# Patient Record
Sex: FEMALE | Race: WHITE
[De-identification: names, ages, dates, MRNs, and addresses within clinical notes are randomized per-mention and may not be internally consistent; named-entity substitution may affect disease eponyms.]

---

## 2019-03-27 ENCOUNTER — HOSPITAL ENCOUNTER (EMERGENCY)
Dept: HOSPITAL 10 - FTE | Age: 54
Discharge: HOME | End: 2019-03-27
Payer: COMMERCIAL

## 2019-03-27 ENCOUNTER — HOSPITAL ENCOUNTER (EMERGENCY)
Dept: HOSPITAL 91 - FTE | Age: 54
Discharge: HOME | End: 2019-03-27
Payer: MEDICAID

## 2019-03-27 VITALS — RESPIRATION RATE: 20 BRPM | HEART RATE: 78 BPM | SYSTOLIC BLOOD PRESSURE: 138 MMHG | DIASTOLIC BLOOD PRESSURE: 70 MMHG

## 2019-03-27 VITALS
BODY MASS INDEX: 43.2 KG/M2 | HEIGHT: 61 IN | HEIGHT: 61 IN | WEIGHT: 228.84 LBS | WEIGHT: 228.84 LBS | BODY MASS INDEX: 43.2 KG/M2

## 2019-03-27 DIAGNOSIS — E11.9: ICD-10-CM

## 2019-03-27 DIAGNOSIS — N12: Primary | ICD-10-CM

## 2019-03-27 DIAGNOSIS — I10: ICD-10-CM

## 2019-03-27 DIAGNOSIS — Z79.84: ICD-10-CM

## 2019-03-27 LAB
ADD MAN DIFF?: NO
ADD UMIC: YES
ALANINE AMINOTRANSFERASE: 20 IU/L (ref 13–69)
ALBUMIN/GLOBULIN RATIO: 1.1
ALBUMIN: 4.1 G/DL (ref 3.3–4.9)
ALKALINE PHOSPHATASE: 108 IU/L (ref 42–121)
ANION GAP: 10 (ref 5–13)
ASPARTATE AMINO TRANSFERASE: 20 IU/L (ref 15–46)
BASOPHIL #: 0.1 10^3/UL (ref 0–0.1)
BASOPHILS %: 0.5 % (ref 0–2)
BILIRUBIN,DIRECT: 0 MG/DL (ref 0–0.2)
BILIRUBIN,TOTAL: 0.1 MG/DL (ref 0.2–1.3)
BLOOD UREA NITROGEN: 17 MG/DL (ref 7–20)
CALCIUM: 9.4 MG/DL (ref 8.4–10.2)
CARBON DIOXIDE: 27 MMOL/L (ref 21–31)
CHLORIDE: 103 MMOL/L (ref 97–110)
CREATININE: 0.61 MG/DL (ref 0.44–1)
EOSINOPHILS #: 0.2 10^3/UL (ref 0–0.5)
EOSINOPHILS %: 2.2 % (ref 0–7)
GLOBULIN: 3.7 G/DL (ref 1.3–3.2)
GLUCOSE: 67 MG/DL (ref 70–220)
HEMATOCRIT: 36.6 % (ref 37–47)
HEMOGLOBIN: 11.4 G/DL (ref 12–16)
IMMATURE GRANS #M: 0.06 10^3/UL (ref 0–0.03)
IMMATURE GRANS % (M): 0.6 % (ref 0–0.43)
LYMPHOCYTES #: 2.2 10^3/UL (ref 0.8–2.9)
LYMPHOCYTES %: 21.5 % (ref 15–51)
MEAN CORPUSCULAR HEMOGLOBIN: 25.2 PG (ref 29–33)
MEAN CORPUSCULAR HGB CONC: 31.1 G/DL (ref 32–37)
MEAN CORPUSCULAR VOLUME: 80.8 FL (ref 82–101)
MEAN PLATELET VOLUME: 9.3 FL (ref 7.4–10.4)
MONOCYTE #: 0.7 10^3/UL (ref 0.3–0.9)
MONOCYTES %: 6.9 % (ref 0–11)
NEUTROPHIL #: 7.1 10^3/UL (ref 1.6–7.5)
NEUTROPHILS %: 68.3 % (ref 39–77)
NUCLEATED RED BLOOD CELLS #: 0 10^3/UL (ref 0–0)
NUCLEATED RED BLOOD CELLS%: 0 /100WBC (ref 0–0)
PLATELET COUNT: 367 10^3/UL (ref 140–415)
POTASSIUM: 4 MMOL/L (ref 3.5–5.1)
RED BLOOD COUNT: 4.53 10^6/UL (ref 4.2–5.4)
RED CELL DISTRIBUTION WIDTH: 15.2 % (ref 11.5–14.5)
SODIUM: 140 MMOL/L (ref 135–144)
TOTAL PROTEIN: 7.8 G/DL (ref 6.1–8.1)
UR ASCORBIC ACID: NEGATIVE MG/DL
UR BACTERIA: (no result) /HPF
UR BILIRUBIN (DIP): NEGATIVE MG/DL
UR BLOOD (DIP): NEGATIVE MG/DL
UR CLARITY: CLEAR
UR COLOR: (no result)
UR GLUCOSE (DIP): NEGATIVE MG/DL
UR KETONES (DIP): NEGATIVE MG/DL
UR LEUKOCYTE ESTERASE (DIP): (no result) LEU/UL
UR NITRITE (DIP): NEGATIVE MG/DL
UR PH (DIP): 5 (ref 5–9)
UR RBC: 1 /HPF (ref 0–5)
UR SPECIFIC GRAVITY (DIP): 1.01 (ref 1–1.03)
UR SQUAMOUS EPITHELIAL CELL: (no result) /HPF
UR TOTAL PROTEIN (DIP): NEGATIVE MG/DL
UR UROBILINOGEN (DIP): NEGATIVE MG/DL
UR WBC: 1 /HPF (ref 0–5)
WHITE BLOOD COUNT: 10.4 10^3/UL (ref 4.8–10.8)

## 2019-03-27 PROCEDURE — 96372 THER/PROPH/DIAG INJ SC/IM: CPT

## 2019-03-27 PROCEDURE — 81001 URINALYSIS AUTO W/SCOPE: CPT

## 2019-03-27 PROCEDURE — 87086 URINE CULTURE/COLONY COUNT: CPT

## 2019-03-27 PROCEDURE — 85025 COMPLETE CBC W/AUTO DIFF WBC: CPT

## 2019-03-27 PROCEDURE — 99284 EMERGENCY DEPT VISIT MOD MDM: CPT

## 2019-03-27 PROCEDURE — 80053 COMPREHEN METABOLIC PANEL: CPT

## 2019-03-27 RX ADMIN — HYDROCODONE BITARTRATE AND ACETAMINOPHEN 1 TAB: 5; 325 TABLET ORAL at 16:50

## 2019-03-27 RX ADMIN — KETOROLAC TROMETHAMINE 1 MG: 30 INJECTION, SOLUTION INTRAMUSCULAR at 16:48

## 2019-03-27 RX ADMIN — CEFTRIAXONE SODIUM 1 GM: 1 INJECTION, POWDER, FOR SOLUTION INTRAMUSCULAR; INTRAVENOUS at 17:48

## 2019-03-27 NOTE — ERD
ER Documentation


Chief Complaint


Chief Complaint





low back pain d/t fall yesterday





HPI


53-year-old female presents with complaint of dysuria, low back pain for the 


past 3 days.  Has not been taking any treatments.  Denies hematuria, vomiting, 


fevers.  History of diabetes and hypertension.  Takes metformin.  Denies 


allergies.





ROS


All systems reviewed and are negative except as per history of present illness.





Medications


Home Meds


Active Scripts


Hydrocodone/Acetaminophen (Norco 5-325 Tablet) 1 Each Tablet, 1 TAB PO Q6H PRN 


for PAIN, #10 TAB


   Prov:RUBY PORTER         3/27/19


Ibuprofen* (Motrin*) 600 Mg Tab, 600 MG PO Q6H PRN for PAIN AND OR ELEVATED TEMP


, #30 TAB


   Prov:RUBY PORTER         3/27/19


Cephalexin* (Keflex*) 500 Mg Capsule, 500 MG PO BID for 14 Days, CAP


   Prov:RUBY PORTER         3/27/19


Nystatin (Nystatin Powder) 1 Each Powder.ea., 1 APPLIC TOPICAL BID for 7 Days, 


#1 BOTTLE


   Prov:PEPE CENTENO MD         1/12/17


Fluconazole* (Diflucan*) 150 Mg Tablet, 150 MG PO ONCE, #2 TAB


   Repeat after 1 week for persistent symptoms.


   Prov:PEPE CENTENO MD         1/12/17





Allergies


Allergies:  


Coded Allergies:  


     No Known Allergy (Unverified , 3/27/19)





PMhx/Soc


Medical and Surgical Hx:  pt denies Surgical Hx


Hx Cardiac Disorders:  Yes (HTN)


Hx Miscellaneous Medical Probl:  Yes (dm)


Hx Alcohol Use:  No


Hx Substance Use:  No


Hx Tobacco Use:  No


Smoking Status:  Never smoker





FmHx


Family History:  No diabetes, No coronary disease, No other





Physical Exam


Vitals





Vital Signs


  Date      Temp  Pulse  Resp  B/P (MAP)   Pulse Ox  O2          O2 Flow    FiO2


Time                                                 Delivery    Rate


   3/27/19  97.1     78    20      138/70        99


     13:54                           (92)





Physical Exam


Const:   No acute distress


Head:   Atraumatic 


Eyes:    Normal Conjunctiva


ENT:    Normal External Ears, Nose and Mouth.


Neck:               Full range of motion. No meningismus.


Resp:   Clear to auscultation bilaterally


Cardio:   Regular rate and rhythm, no murmurs


Abd:    Soft, non tender, non distended. Normal bowel sounds


Skin:   No petechiae or rashes


Back:   No midline tenderness.  Positive CVA tenderness bilaterally.


Ext:    No cyanosis, or edema.  No saddle numbness


Neur:   Awake and alert


Psych:    Normal Mood and Affect


Result Diagram:  


3/27/19 1639                                                                    


           3/27/19 1639





Results 24 hrs





Laboratory Tests


              Test
                                  3/27/19
16:39


              White Blood Count                      10.4 10^3/ul


              Red Blood Count                        4.53 10^6/ul


              Hemoglobin                                11.4 g/dl


              Hematocrit                                   36.6 %


              Mean Corpuscular Volume                     80.8 fl


              Mean Corpuscular Hemoglobin                 25.2 pg


              Mean Corpuscular Hemoglobin
Concent      31.1 g/dl 



              Red Cell Distribution Width                  15.2 %


              Platelet Count                          367 10^3/UL


              Mean Platelet Volume                         9.3 fl


              Immature Granulocytes %                     0.600 %


              Neutrophils %                                68.3 %


              Lymphocytes %                                21.5 %


              Monocytes %                                   6.9 %


              Eosinophils %                                 2.2 %


              Basophils %                                   0.5 %


              Nucleated Red Blood Cells %             0.0 /100WBC


              Immature Granulocytes #               0.060 10^3/ul


              Neutrophils #                           7.1 10^3/ul


              Lymphocytes #                           2.2 10^3/ul


              Monocytes #                             0.7 10^3/ul


              Eosinophils #                           0.2 10^3/ul


              Basophils #                             0.1 10^3/ul


              Nucleated Red Blood Cells #             0.0 10^3/ul


              Urine Color                          STRAW


              Urine Clarity                        CLEAR


              Urine pH                                        5.0


              Urine Specific Gravity                        1.012


              Urine Ketones                        NEGATIVE mg/dL


              Urine Nitrite                        NEGATIVE mg/dL


              Urine Bilirubin                      NEGATIVE mg/dL


              Urine Urobilinogen                   NEGATIVE mg/dL


              Urine Leukocyte Esterase             TRACE Tex/ul


              Urine Microscopic RBC                        1 /HPF


              Urine Microscopic WBC                        1 /HPF


              Urine Squamous Epithelial
Cells      FEW /HPF 



              Urine Bacteria                       FEW /HPF


              Urine Hemoglobin                     NEGATIVE mg/dL


              Urine Glucose                        NEGATIVE mg/dL


              Urine Total Protein                  NEGATIVE mg/dl


              Sodium Level                             140 mmol/L


              Potassium Level                          4.0 mmol/L


              Chloride Level                           103 mmol/L


              Carbon Dioxide Level                      27 mmol/L


              Anion Gap                                        10


              Blood Urea Nitrogen                        17 mg/dl


              Creatinine                               0.61 mg/dl


              Est Glomerular Filtrat Rate
mL/min   > 60 mL/min 



              Glucose Level                              67 mg/dl


              Calcium Level                             9.4 mg/dl


              Total Bilirubin                           0.1 mg/dl


              Direct Bilirubin                         0.00 mg/dl


              Indirect Bilirubin                        0.1 mg/dl


              Aspartate Amino Transf
(AST/SGOT)          20 IU/L 



              Alanine Aminotransferase
(ALT/SGPT)        20 IU/L 



              Alkaline Phosphatase                       108 IU/L


              Total Protein                              7.8 g/dl


              Albumin                                    4.1 g/dl


              Globulin                                  3.70 g/dl


              Albumin/Globulin Ratio                         1.10





Current Medications


 Medications
   Dose
          Sig/Sivakumar
       Start Time
   Status  Last


 (Trade)       Ordered        Route
 PRN     Stop Time              Admin
Dose


                              Reason                                Admin


 Ketorolac
     60 mg          ONCE  STAT
    3/27/19       DC           3/27/19


Tromethamine
                 IM
            16:33
                       16:48



 (Toradol)                                   3/27/19 16:37


                1 tab          ONCE  ONCE
    3/27/19       DC           3/27/19


Acetaminophen                 PO
            17:00
                       16:50



/
                                           3/27/19 17:01


Hydrocodone


Bitart



(Norco


(5/325))


 Ceftriaxone    1 gm           ONCE  ONCE
    3/27/19       DC           3/27/19


Sodium
                       IM
            18:00
                       17:48



(Rocephin)                                   3/27/19 18:00








Procedures/MDM


53-year-old female presents with complaint of dysuria, low back pain for the 


past 3 days.  Has not been taking any treatments.  Denies hematuria, vomiting, 


fevers.  History of diabetes and hypertension.  Takes metformin.  Denies 


allergies.  Presentation consistent with pyelonephritis.  Patient was given 


ceftriaxone IM in the ER and discharged with Rx for Keflex for 14 days.  I will 


suspicion for cauda equina, epidural abscess, or any other emergent condition.  


Patient discharged with strict ER precautions. Patient advised to follow up with


PMD. All questions answered at discharge. 





Patient's UA had trace leuks and patient had acute CVA tenderness on the exam 


along with dysuria so decision was made to treat for possible pyelonephritis 


with IM ceftriaxone in the ER. patient was given Toradol and 1 [place] in the ER 


to help with the back pain.  I have low suspicion for epidural abscess, cauda 


equina, abdominal aortic aneurysm,  aortic dissection, spinal fracture, or other


emergent conditions based on patient history and exam findings.  Patient told if


they experience leg weakness or numbness, or incontinence they need to return to


the ER immediately. Patient discharged with strict ER precautions. Patient 


advised to follow up with PMD. All questions answered at discharge.





Departure


Diagnosis:  


   Primary Impression:  


   Pyelonephritis


   Additional Impression:  


   Back pain


   Back pain location:  low back pain  Chronicity:  acute  Back pain laterality:


    bilateral  Sciatica presence:  without sciatica  Qualified Codes:  M54.5 - 


   Low back pain


Condition:  Stable


Patient Instructions:  Pyelonephritis, Female (Adult)


Referrals:  


[facility]


YOU HAVE RECEIVED A MEDICAL SCREENING EXAM AND THE RESULTS INDICATE THAT YOU DO 


NOT HAVE A CONDITION THAT REQUIRES URGENT TREATMENT IN THE EMERGENCY DEPARTMENT.





FURTHER EVALUATION AND TREATMENT OF YOUR CONDITION CAN WAIT UNTIL YOU ARE SEEN 


IN YOUR DOCTORS OFFICE WITHIN THE NEXT 1-2 DAYS. IT IS YOUR RESPONSIBILITY TO 


MAKE AN APPOINTMENT FOR FOLOW-UP CARE.





IF YOU HAVE A PRIMARY DOCTOR


--you should call your primary doctor and schedule an appointment





IF YOU DO NOT HAVE A PRIMARY DOCTOR YOU CAN CALL OUR PHYSICIAN REFERRAL HOTLINE 


AT


 [phone] 





IF YOU CAN NOT AFFORD TO SEE A PHYSICIAN YOU CAN CHOSE FROM THE FOLLOWING 


[facility] [phone] [address] [facility] [phone] [address] [facility] [phone] [address] [facility] [phone] [address] [facility]. [facility] [phone] [address] [facility]. [facility]. [phone]


[address]





Additional Instructions:  


FOLLOW UP WITH YOUR PRIMARY CARE PHYSICIAN TOMORROW.Return to this facility if 


you are not improving as expected.











RUBY PORTER               Mar 27, 2019 17:43

## 2019-08-11 ENCOUNTER — HOSPITAL ENCOUNTER (EMERGENCY)
Dept: HOSPITAL 10 - FTE | Age: 54
Discharge: HOME | End: 2019-08-11
Payer: MEDICAID

## 2019-08-11 ENCOUNTER — HOSPITAL ENCOUNTER (EMERGENCY)
Dept: HOSPITAL 91 - FTE | Age: 54
Discharge: HOME | End: 2019-08-11
Payer: MEDICAID

## 2019-08-11 VITALS
WEIGHT: 226.64 LBS | BODY MASS INDEX: 42.79 KG/M2 | WEIGHT: 226.64 LBS | HEIGHT: 61 IN | BODY MASS INDEX: 42.79 KG/M2 | HEIGHT: 61 IN

## 2019-08-11 VITALS — RESPIRATION RATE: 18 BRPM | DIASTOLIC BLOOD PRESSURE: 77 MMHG | HEART RATE: 73 BPM | SYSTOLIC BLOOD PRESSURE: 206 MMHG

## 2019-08-11 DIAGNOSIS — I10: ICD-10-CM

## 2019-08-11 DIAGNOSIS — E11.9: ICD-10-CM

## 2019-08-11 DIAGNOSIS — M19.90: Primary | ICD-10-CM

## 2019-08-11 PROCEDURE — 96372 THER/PROPH/DIAG INJ SC/IM: CPT

## 2019-08-11 PROCEDURE — 99284 EMERGENCY DEPT VISIT MOD MDM: CPT

## 2019-08-11 RX ADMIN — KETOROLAC TROMETHAMINE 1 MG: 30 INJECTION, SOLUTION INTRAMUSCULAR at 10:27

## 2019-08-11 NOTE — ERD
ER Documentation


Chief Complaint


Chief Complaint





generalized bodyache hx arthritis and fibromyalgia





HPI


53-year-old female presenting with arthritis and diffuse body aches.  Patient is


taking gabapentin with no alleviation of symptoms.  She states that she has 


constant pain and denies any new severe pain.  Denies chest pain.  Denies 


shortness of breath.  Denies recent falls or injuries.  Patient states this is 


her normal fibromyalgia pain.  Medical history is hypertension and DM.  NKDA.  


Surgical history .  Social history denies





ROS


All systems reviewed and are negative except as per history of present illness.





Medications


Home Meds


Active Scripts


Diclofenac Sodium* (Voltaren* Gel) 1% -100 Gm Gel, 4 GM TOP QID, #1 TUB


   Prov:NARINDER MATA PA-C         19


Tramadol HCl (Tramadol HCl) 50 Mg Tablet, 50 MG PO Q4 PRN for PAIN, #20 TAB


   Prov:NARINDER MATA PA-C         19


Hydrocodone/Acetaminophen (Norco 5-325 Tablet) 1 Each Tablet, 1 TAB PO Q6H PRN 


for PAIN, #10 TAB


   Prov:RUBY PORTER         3/27/19


Ibuprofen* (Motrin*) 600 Mg Tab, 600 MG PO Q6H PRN for PAIN AND OR ELEVATED 


TEMP, #30 TAB


   Prov:RUBY PORTER         3/27/19


Cephalexin* (Keflex*) 500 Mg Capsule, 500 MG PO BID for 14 Days, CAP


   Prov:RUBY PORTER         3/27/19


Nystatin (Nystatin Powder) 1 Each Powder.ea., 1 APPLIC TOPICAL BID for 7 Days, 


#1 BOTTLE


   Prov:PEPE CENTENO MD         17


Fluconazole* (Diflucan*) 150 Mg Tablet, 150 MG PO ONCE, #2 TAB


   Repeat after 1 week for persistent symptoms.


   Prov:PEPE CENTENO MD         17





Allergies


Allergies:  


Coded Allergies:  


     No Known Allergy (Unverified , 3/27/19)





PMhx/Soc


History of Surgery:  Yes (C SECTION)


Hx Cardiac Disorders:  Yes (HTN)


Hx Miscellaneous Medical Probl:  Yes (arthritis,fibromyalgia)


Hx Alcohol Use:  No


Hx Substance Use:  No


Hx Tobacco Use:  No


Smoking Status:  Never smoker





FmHx


Family History:  No diabetes, No coronary disease, No other





Physical Exam


Vitals





Vital Signs


  Date      Temp  Pulse  Resp  B/P (MAP)   Pulse Ox  O2          O2 Flow    FiO2


Time                                                 Delivery    Rate


   19  98.2     73    18      206/77        98


     09:57                          (120)





Physical Exam


GENERAL: The patient is well-appearing, well-nourished, in no acute distress


HEENT: Atraumatic.  Conjunctivae are pink.  Pupils equal, round, and reactive to


light.  There is no scleral icterus.  Tympanic membranes clear bilaterally.  


Oropharynx clear.  


CHEST: Clear to auscultation bilaterally.  There are no rales, wheezes or 


rhonchi.


HEART: Regular rate and rhythm.  No murmurs, clicks, rubs or gallops.  


EXTREMITIES: Equal pulses bilaterally.  There is no peripheral clubbing, 


cyanosis or edema.  No focal swelling or erythema.  Full range of motion.  


Grossly neurovascularly intact.


NEUROLOGIC: Alert and oriented.  Cranial nerves II through XII intact.  Motor 


strength in all 4 extremities with 5 out of 5 strength.  Sensation grossly 


intact.  Normal speech and gait.  


SKIN: There is no apparent rash or petechiae.  The skin is warm and dry.


Results 24 hrs





Current Medications


 Medications
   Dose
          Sig/Sivakumar
       Start Time
   Status  Last


 (Trade)       Ordered        Route
 PRN     Stop Time              Admin
Dose


                              Reason                                Admin


 Ketorolac
     60 mg          ONCE  STAT
    19       DC           19


Tromethamine
                 IM
            10:22
                       10:27



 (Toradol)                                   19 10:23








Procedures/MDM


ER course: Toradol given in ED.





MDM: 53-year-old female presenting with body aches.  I have low suspicion for 


acute fracture dislocation.  I have low suspicion for tendon or ligament 


rupture.  I have low suspicion for septic joint or neuro deficit.  Patient 


likely has exacerbated pain secondary to her chronic illness and will be 


discharged with supportive medications.  Patient is told symptoms change or 


worsen to return immediately to the ER.  All questions answered at discharge





Departure


Diagnosis:  


   Primary Impression:  


   Arthritis


Condition:  Stable


Patient Instructions:  Osteoarthritis


Referrals:  


[facility]


YOU HAVE RECEIVED A MEDICAL SCREENING EXAM AND THE RESULTS INDICATE THAT YOU DO 


NOT HAVE A CONDITION THAT REQUIRES URGENT TREATMENT IN THE EMERGENCY DEPARTMENT.





FURTHER EVALUATION AND TREATMENT OF YOUR CONDITION CAN WAIT UNTIL YOU ARE SEEN 


IN YOUR DOCTORS OFFICE WITHIN THE NEXT 1-2 DAYS. IT IS YOUR RESPONSIBILITY TO 


MAKE AN APPOINTMENT FOR FOLOW-UP CARE.





IF YOU HAVE A PRIMARY DOCTOR


--you should call your primary doctor and schedule an appointment





IF YOU DO NOT HAVE A PRIMARY DOCTOR YOU CAN CALL OUR PHYSICIAN REFERRAL HOTLINE 


AT


 [phone] 





IF YOU CAN NOT AFFORD TO SEE A PHYSICIAN YOU CAN CHOSE FROM THE FOLLOWING 


[facility]





[facility] [phone] [address] [facility] [phone] [address] [facility]. [facility] [phone] [address] [facility] [phone] [address] [facility] [phone] [address] [facility] [phone]


[address]





Additional Instructions:  


FOLLOW UP WITH YOUR PRIMARY CARE PHYSICIAN TOMORROW.Return to this facility if 


you are not improving as expected.











NARINDER MATA PA-C       Aug 11, 2019 13:32